# Patient Record
Sex: FEMALE | Race: WHITE | URBAN - METROPOLITAN AREA
[De-identification: names, ages, dates, MRNs, and addresses within clinical notes are randomized per-mention and may not be internally consistent; named-entity substitution may affect disease eponyms.]

---

## 2019-03-18 ENCOUNTER — OFFICE VISIT (OUTPATIENT)
Dept: RHEUMATOLOGY | Age: 34
End: 2019-03-18

## 2019-03-18 VITALS
HEIGHT: 72 IN | HEART RATE: 94 BPM | BODY MASS INDEX: 39.68 KG/M2 | TEMPERATURE: 98.6 F | RESPIRATION RATE: 20 BRPM | SYSTOLIC BLOOD PRESSURE: 127 MMHG | WEIGHT: 293 LBS | DIASTOLIC BLOOD PRESSURE: 87 MMHG | OXYGEN SATURATION: 96 %

## 2019-03-18 DIAGNOSIS — R76.8 RHEUMATOID FACTOR POSITIVE: ICD-10-CM

## 2019-03-18 DIAGNOSIS — E66.9 OBESITY, UNSPECIFIED CLASSIFICATION, UNSPECIFIED OBESITY TYPE, UNSPECIFIED WHETHER SERIOUS COMORBIDITY PRESENT: Primary | ICD-10-CM

## 2019-03-18 DIAGNOSIS — R70.0 ELEVATED SED RATE: ICD-10-CM

## 2019-03-18 DIAGNOSIS — E11.8 CONTROLLED DIABETES MELLITUS TYPE 2 WITH COMPLICATIONS, UNSPECIFIED WHETHER LONG TERM INSULIN USE (HCC): ICD-10-CM

## 2019-03-18 RX ORDER — MELOXICAM 15 MG/1
15 TABLET ORAL DAILY
COMMUNITY

## 2019-03-18 RX ORDER — CYCLOBENZAPRINE HCL 10 MG
10 TABLET ORAL
COMMUNITY

## 2019-03-18 RX ORDER — BUSPIRONE HYDROCHLORIDE 10 MG/1
10 TABLET ORAL 2 TIMES DAILY
COMMUNITY

## 2019-03-18 RX ORDER — PREDNISONE 20 MG/1
TABLET ORAL
Qty: 21 TAB | Refills: 0 | Status: SHIPPED | OUTPATIENT
Start: 2019-03-18 | End: 2019-04-16

## 2019-03-18 RX ORDER — METFORMIN HYDROCHLORIDE 500 MG/1
1000 TABLET, EXTENDED RELEASE ORAL 2 TIMES DAILY
COMMUNITY

## 2019-03-18 RX ORDER — FLUOXETINE HYDROCHLORIDE 40 MG/1
CAPSULE ORAL DAILY
COMMUNITY

## 2019-03-18 RX ORDER — PANTOPRAZOLE SODIUM 40 MG/1
40 TABLET, DELAYED RELEASE ORAL DAILY
COMMUNITY

## 2019-03-18 RX ORDER — LISINOPRIL AND HYDROCHLOROTHIAZIDE 10; 12.5 MG/1; MG/1
TABLET ORAL DAILY
COMMUNITY

## 2019-03-18 RX ORDER — GLIPIZIDE 10 MG/1
10 TABLET ORAL DAILY
COMMUNITY

## 2019-03-18 NOTE — PROGRESS NOTES
REASON FOR VISIT    This is the initial evaluation for Ms. Michael Ambrose a 28 y.o.  female for question of diffuse joint pains. The patient is referred to the [facility] at the request of robin bradley. HISTORY OF PRESENT ILLNESS     This is a 28 y.o. female with hx DM, anxiety, depression, GERD. Today, the patient complains of all over body pain especially in lower back and neck but it radiates to other parts as well. She has tried meloxicam and flexeril for this. MHAQ: 0.5  Pain scale: 75/100  The patient was found to have inflammation in 11/2018 after blood work was done. Lyme test was negative. She was treated for gout and it didn't help. She was supposed to go to [facility] but she never did. The patient has pain in feet, knees, elbows, neck and mid to lower back. She has had pain since 11/2018. Before that she didn't have continuous pain. She has pain all the time. Meloxicam helps it slightly but doesn't get rid of the pain. It is a dull along with stabbing/burning pain. Pain is worsened by moving and is slightly better with resting. Nothing else helps. She notes that from 11/2018 to 1/2019 she had swollen hot elbow. That is when she was treated for gout but it didn't work. NO swollen joints now. She has pain in hands as well. In the morning, in her II MCP she has pain. This gets better as the day goes on. Morning stiffness in hands for a few hours. No steroids recently. NO recent infections before pain started. NO travel before pain started. NO new medications before pain started. Last summer she did not spend time outside. She spent most of her time indoors. REVIEW OF SYSTEMS    A 15 point review of systems was performed and summarized below. The questionnaire was reviewed with the patient and scanned into the patient's medical record.     General: endorses  fatigue, weakness, denies recent weight gain, recent weight loss, fever, night sweats  Musculoskeletal: endorses  joint pain,muscle pain  denies joint swelling, morning stiffness    Ears:  denies ringing in ears, loss of hearing, deafness  Eyes:  denies pain, redness, loss of vision, double vision, blurred vision, dryness, foreign body sensation  Mouth:  denies sore tongue, oral ulcers, bleeding gums, loss of taste, dryness, increased dental caries  Nose: denies nosebleeds, loss of smell, nasal ulcers  Throat:  denies frequent sore throats, hoarseness, difficulty in swallowing, pain in jaw while chewing  Neck: endorses , tender glands denies swollen glands  Cardiopulmonary: endorses dry cough denies pain in chest, irregular heart beat, sudden changes in heart beat, shortness of breath, difficulty breathing at night, , productive cough, coughing of blood, wheezing  Gastrointestinal: endorses  heartburn, denies nausea,stomach pain relieved by food, vomiting of blood/\"coffee grounds\", jaundice, increasing constipation, persistent diarrhea, blood in stools, black stools  Genitourinary:  denies nocturia, difficult urination, pain or burning on urination, blood in urine, cloudy urine, pus in urine, genital discharge, frequent urination, vaginal dryness, rash/ulcers, sexual difficulties  Hematologic:  denies anemia, bleeding tendency, blood clots  Skin: endorses  easy bruising, sun sensitive, redness,hair loss,denies rash, hives, skin tightness, nodules/bumps,  color changes of hands or feet in the cold (Raynaud's), nailbed changes, mechanics hands  Neurologic: endorses headaches, numbness or tingling in hands/feet, denies dizziness, muscle weakness, memory loss  Psychiatric: denies depression, excessive worries, PTSD, Bipolar  Sleep: endorses poor sleep (5-7 hours), daytime somnolence, difficulty falling asleep, difficulty staying asleep   denies  denies snoring, apnea,   PAST MEDICAL HISTORY    Past Medical History:   Diagnosis Date    Depression     Diabetes ([facility] [address]     Hypertension         Past Surgical History:   Procedure Laterality Date    HX GYN  10/2008    , 10/2008 & 2017       FAMILY HISTORY    Family History   Problem Relation Age of Onset    Thyroid Disease Father     Gout Father        SOCIAL HISTORY    Social History     Tobacco Use    Smoking status: Never Smoker    Smokeless tobacco: Never Used   Substance Use Topics    Alcohol use: No     Frequency: Never    Drug use: No       MEDICATIONS        ALLERGIES    Allergies   Allergen Reactions    Percocet [Oxycodone-Acetaminophen] Rash       PHYSICAL EXAMINATION    Visit Vitals  /87 (BP 1 Location: Right arm, BP Patient Position: Sitting)   Pulse 94   Temp 98.6 °F (37 °C) (Oral)   Resp 20   Ht 6' 1\" (1.854 m)   Wt 345 lb 9.6 oz (156.8 kg)   SpO2 96%   BMI 45.60 kg/m²     Body mass index is 45.6 kg/m². General: NAD morbidly obese  HEENT: PERRL, anicteric, non-injected sclerae; oropharynx without ulcers, erythema, or exudate. Moist mucous membranes. Lymphatic: No cervical or axillary lymphadenopathy. Cardiovascular: S1, S2,no R/M/G  Pulmonary: CTA b/l. No wheezes/rales/rhonchi. Abdominal: Soft,NTND, + BS. Skin: flat erythema on the skin everywhere. Neuro: Alert; able to carry normal conversation    Musculoskeletal:   Multiple tender points. TTP of cervical spine and lower lumbar spine  TTP of b/l tronchateric bursa noted    DATA REVIEW    Prior medical records were reviewed and if applicable are summarized as below:    Labs:   2018: RF 91.7, CCP 4.7,, CRP 4.2, ESR 38 ALYSSIA negative, cbc, bmp unremarkable,  C3, C4 elevated, vitamin D normal, urinalysis with some protein    Imaging:   N/A    ASSESSMENT AND PLAN    A 28 y.o. female with hx of DM, HTN, morbid obesity presents for evaluation of joint pains. The patient's pain is largely non inflammatory but her hand pain does sound inflammatory. She also has positive serologies which is concerning.   Her elevated inflammatory markers are likely due to female sex and morbid obesity. # Arthralgias:  Unclear if inflammatory but hand pain could be inflammatory in nature  - will do a trial of prednisone 20mg oral daily for 2 weeks, then 10 mg daily for 2 weeks then stop  -  I counseled the patient on the risk of avascular necrosis from corticosteroids. For each 20 mg of prednisone taken for over a month, the risk of AVN is 5%. It can also lead to weight gain, mood imbalances, osteoporosis, acne etc.  - repeat RF and CCP    # Elevated RF:  - hepatitis C,  - SSA/B  - TSH    # Elevated inflammatory markers: as noted above, likely due to obesity and female sex  - repeat markers today    # Diabetes:  - advised patient to monitor her blood sugars closely while on the steroids. The patient voiced understanding of the aforementioned assessment and plan. Summary of plan was provided in the After Visit Summary patient instructions. I also provided education about [employer] setup and utility. Ms. Amira Alicia has a reminder for a \"due or due soon\" health maintenance. I have asked that she contact her primary care provider for follow-up on this health maintenance.     TODAY'S ORDERS    Orders Placed This Encounter    CYCLIC CITRUL PEPTIDE AB, IGG    C REACTIVE PROTEIN, QT    SED RATE (ESR)    RHEUMATOID FACTOR, QL    CHRONIC HEPATITIS PANEL    SJOGREN'S ABS, SSA AND SSB    TSH +T4F + T-3UPT    busPIRone (BUSPAR) 10 mg tablet    cyclobenzaprine (FLEXERIL) 10 mg tablet    FLUoxetine (PROZAC) 40 mg capsule    glipiZIDE (GLUCOTROL) 10 mg tablet    lisinopril-hydroCHLOROthiazide (PRINZIDE, ZESTORETIC) 10-12.5 mg per tablet    meloxicam (MOBIC) 15 mg tablet    metFORMIN ER (GLUCOPHAGE XR) 500 mg tablet    fish oil-omega-3 fatty acids 340-1,000 mg capsule    pantoprazole (PROTONIX) 40 mg tablet    SITagliptin (JANUVIA) 100 mg tablet    levonorgest-eth.estradiol-iron (BALCOLTRA) 0.1 mg-0.02 mg (21)/36.5 mg(7) tab    prenatal vit calc,iron,folic (PRENATAL VITAMIN PO)    predniSONE (DELTASONE) 20 mg tablet       Future Appointments   Date Time Provider Department Center   4/16/2019  8:00 AM Jesse Marte  Yolanda Whitaker MD    Adult Rheumatology   [facility]  A Part of [address], [address] [place] [address]   Phone [phone]  Fax [phone]

## 2019-03-18 NOTE — PROGRESS NOTES
Chief Complaint   Patient presents with    Joint Pain     neck     1. Have you been to the ER, urgent care clinic since your last visit? Hospitalized since your last visit? No    2. Have you seen or consulted any other health care providers outside of the [address] [place] [address] since your last visit? Include any pap smears or colon screening.  No

## 2019-03-20 LAB
CCP IGA+IGG SERPL IA-ACNC: 28 UNITS (ref 0–19)
COMMENT, 144067: NORMAL
CRP SERPL-MCNC: 27.3 MG/L (ref 0–4.9)
ENA SS-A AB SER-ACNC: <0.2 AI (ref 0–0.9)
ENA SS-B AB SER-ACNC: <0.2 AI (ref 0–0.9)
ERYTHROCYTE [SEDIMENTATION RATE] IN BLOOD BY WESTERGREN METHOD: 45 MM/HR (ref 0–32)
HBV CORE AB SERPL QL IA: NEGATIVE
HBV CORE IGM SERPL QL IA: NEGATIVE
HBV E AB SERPL QL IA: NEGATIVE
HBV E AG SERPL QL IA: NEGATIVE
HBV SURFACE AB SER QL: NON REACTIVE
HBV SURFACE AG SERPL QL IA: NEGATIVE
HCV AB S/CO SERPL IA: <0.1 S/CO RATIO (ref 0–0.9)
RHEUMATOID FACT SERPL-ACNC: 60.8 IU/ML (ref 0–13.9)
T3RU NFR SERPL: 24 % (ref 24–39)
T4 FREE SERPL-MCNC: 1.19 NG/DL (ref 0.82–1.77)
TSH SERPL DL<=0.005 MIU/L-ACNC: 1.88 UIU/ML (ref 0.45–4.5)

## 2019-04-16 ENCOUNTER — OFFICE VISIT (OUTPATIENT)
Dept: RHEUMATOLOGY | Age: 34
End: 2019-04-16

## 2019-04-16 VITALS
HEART RATE: 85 BPM | OXYGEN SATURATION: 96 % | BODY MASS INDEX: 39.68 KG/M2 | TEMPERATURE: 98.1 F | SYSTOLIC BLOOD PRESSURE: 129 MMHG | RESPIRATION RATE: 20 BRPM | DIASTOLIC BLOOD PRESSURE: 83 MMHG | WEIGHT: 293 LBS | HEIGHT: 72 IN

## 2019-04-16 DIAGNOSIS — Z79.60 LONG-TERM USE OF IMMUNOSUPPRESSANT MEDICATION: Primary | ICD-10-CM

## 2019-04-16 DIAGNOSIS — Z79.4 CONTROLLED TYPE 2 DIABETES MELLITUS WITH COMPLICATION, WITH LONG-TERM CURRENT USE OF INSULIN (HCC): ICD-10-CM

## 2019-04-16 DIAGNOSIS — L71.9 ROSACEA: ICD-10-CM

## 2019-04-16 DIAGNOSIS — M54.50 CHRONIC BILATERAL LOW BACK PAIN WITHOUT SCIATICA: ICD-10-CM

## 2019-04-16 DIAGNOSIS — M05.9 SEROPOSITIVE RHEUMATOID ARTHRITIS (HCC): ICD-10-CM

## 2019-04-16 DIAGNOSIS — E66.01 MORBID OBESITY (HCC): ICD-10-CM

## 2019-04-16 DIAGNOSIS — E11.8 CONTROLLED TYPE 2 DIABETES MELLITUS WITH COMPLICATION, WITH LONG-TERM CURRENT USE OF INSULIN (HCC): ICD-10-CM

## 2019-04-16 DIAGNOSIS — G89.29 CHRONIC BILATERAL LOW BACK PAIN WITHOUT SCIATICA: ICD-10-CM

## 2019-04-16 RX ORDER — INSULIN GLARGINE 100 [IU]/ML
15 INJECTION, SOLUTION SUBCUTANEOUS
COMMUNITY

## 2019-04-16 RX ORDER — INSULIN GLARGINE 100 [IU]/ML
10 INJECTION, SOLUTION SUBCUTANEOUS DAILY
COMMUNITY

## 2019-04-16 RX ORDER — HYDROXYCHLOROQUINE SULFATE 200 MG/1
400 TABLET, FILM COATED ORAL DAILY
Qty: 60 TAB | Refills: 3 | Status: SHIPPED | OUTPATIENT
Start: 2019-04-16 | End: 2019-09-09 | Stop reason: SDUPTHER

## 2019-04-16 NOTE — PROGRESS NOTES
REASON FOR VISIT Patient presents for a follow up visit. HISTORY OF DISEASE: Seropositive Rheumatoid Arthritis Year of diagnosis:  First visit with me: 3/2019 Cumulative disease manifestations: small joint arthralgias; positive serologies Positive serologies/labs: RF, CCP Negative serologies/labs: Other co-morbidities: DM, anxiety, depression, GERD Relapses:  
  
Past treatment history: 
Prednisone: Prednisone taper Non-biologic DMARD:  
Biologic:  
Other: 
  
HISTORY OF PRESENT ILLNESS This is a 28 y.o. female with hx DM, anxiety, depression, GERD. The patient notes that the prednisone didn't help but it made her push through the pain due to more energy. It did cause an increase in sugars as well. She is now on insulin as well. It helped with the stiffness in the hands and the feet. She is not sure but it could have helped with the hand pain as well. She might have moved her hand more. It did nothing for the back pain in the entire back. Hand pain is now gone along with foot, and leg pain. The back pain is the only persistent pain now. Location: entire back Quality: aching pain Severity:  10/10 Timing:all day Duration:  years Modifying factors: improvement in other pain NO swelling in any of the joints. She folded laundry and the hands didn't hurt. She finished the prednisone 2 days ago. REVIEW OF SYSTEMS A comprehensive review of systems was performed and pertinent results are documented in the HPI, review of systems is otherwise non-contributory. PAST MEDICAL HISTORY Past Medical History:  
Diagnosis Date  Depression  Diabetes ([address]  Hypertension Past Surgical History:  
Procedure Laterality Date  HX GYN  10/2008 , 10/2008 & 2017 FAMILY HISTORY Family History Problem Relation Age of Onset  Thyroid Disease Father  Gout Father SOCIAL HISTORY Social History Tobacco Use  
  Smoking status: Never Smoker  Smokeless tobacco: Never Used Substance Use Topics  Alcohol use: No  
  Frequency: Never  Drug use: No  
 
 
MEDICATIONS Current Outpatient Medications:  
  insulin glargine (LANTUS U-100 INSULIN) 100 unit/mL injection, 15 Units by SubCUTAneous route nightly., Disp: , Rfl:  
  insulin glargine (LANTUS U-100 INSULIN) 100 unit/mL injection, 10 Units by SubCUTAneous route daily. , Disp: , Rfl:  
  hydroxychloroquine (PLAQUENIL) 200 mg tablet, Take 2 Tabs by mouth daily. , Disp: 60 Tab, Rfl: 3 
  busPIRone (BUSPAR) 10 mg tablet, Take 10 mg by mouth two (2) times a day., Disp: , Rfl:  
  cyclobenzaprine (FLEXERIL) 10 mg tablet, Take 10 mg by mouth., Disp: , Rfl:  
  FLUoxetine (PROZAC) 40 mg capsule, Take  by mouth daily. , Disp: , Rfl:  
  glipiZIDE (GLUCOTROL) 10 mg tablet, Take 10 mg by mouth daily. , Disp: , Rfl:  
  lisinopril-hydroCHLOROthiazide (PRINZIDE, ZESTORETIC) 10-12.5 mg per tablet, Take  by mouth daily. , Disp: , Rfl:  
  meloxicam (MOBIC) 15 mg tablet, Take 15 mg by mouth daily. , Disp: , Rfl:  
  metFORMIN ER (GLUCOPHAGE XR) 500 mg tablet, Take 1,000 mg by mouth two (2) times a day., Disp: , Rfl:  
  fish oil-omega-3 fatty acids 340-1,000 mg capsule, Take 1 Cap by mouth daily. , Disp: , Rfl:  
  SITagliptin (JANUVIA) 100 mg tablet, Take 100 mg by mouth daily. , Disp: , Rfl:  
  levonorgest-eth.estradiol-iron (BALCOLTRA) 0.1 mg-0.02 mg (21)/36.5 mg(7) tab, Take  by mouth., Disp: , Rfl:  
  pantoprazole (PROTONIX) 40 mg tablet, Take 40 mg by mouth daily. , Disp: , Rfl:  
  prenatal vit calc,iron,folic (PRENATAL VITAMIN PO), Take  by mouth., Disp: , Rfl: ALLERGIES Allergies Allergen Reactions  Percocet [Oxycodone-Acetaminophen] Rash PHYSICAL EXAMINATION Visit Vitals /83 (BP 1 Location: Right arm, BP Patient Position: Sitting) Pulse 85 Temp 98.1 °F (36.7 °C) (Oral) Resp 20 Ht 6' 1\" (1.854 m) Wt 347 lb (157.4 kg) SpO2 96% BMI 45.78 kg/m² Body mass index is 45.78 kg/m². General: NAD morbidly obese HEENT: PERRL, anicteric, non-injected sclerae; oropharynx without ulcers, erythema, or exudate. Moist mucous membranes. Lymphatic: No cervical or axillary lymphadenopathy. Cardiovascular: S1, S2,no R/M/G Pulmonary: CTA b/l. No wheezes/rales/rhonchi. Abdominal: Soft,NTND, + BS. Skin: flat erythema on the skin everywhere. Neuro: Alert; able to carry normal conversation Musculoskeletal:  
Multiple tender points. TTP of cervical spine and lower lumbar spine 
+ patello-compression test b/l Joint Count 4/16/2019 Patient pain (0-100) 30 MHAQ 0.375 Left shoulder - Tender 1 Left knee - Tender 1 Right shoulder - Tender 1 Right 2nd PIP - Swollen 0 Right knee - Tender 1 Tender Joint Count (Total) 4 Swollen Joint Count (Total) 0 Physician Assessment (0-10) 0 Patient Assessment (0-10) 5 CDAI Total (calculated) 9 DATA REVIEW Prior medical records were reviewed and if applicable are summarized as below: 
 
Labs:  
3/2019: CCP 28, CRP 27.3, ESR 45, RF 60.8, Hep B, C negative, SSA/B negative, TSH normal 
11/2018: RF 91.7, CCP 4.7, CRP 4.2, ESR 38 ALYSSIA negative, cbc, bmp unremarkable,  C3, C4 elevated, vitamin D normal, urinalysis with some protein Imaging: N/A 
 
ASSESSMENT AND PLAN 
 
A 28 y.o. female with hx of DM, HTN, morbid obesity, seropositive rheumatoid arthritis presents for a follow up visit. The patient likely has rheumatoid arthritis given that her hand and foot pain responded to prednisone. The patient's back pain is likely mechanical and is worsened by her obesity. # Seropositive Rheumatoid Arthritis:  As noted above, patient likely has rheumatoid arthritis especially given response to prednisone. - will start patient on plaquenil 200 mg BID. I explained the risks and benefits of plaquenil and answered all the questions in detail.  Advised patient to get a baseline eye examination. - no need for prednisone today # Low back pain: likely mechanical in nature - PT referral  
- advised weight loss - Lumbar spine x-ray ordered # Obesity: 
- advised weight loss # Diabetes: 
- advised patient to monitor her blood sugars closely while on the steroids. # Med Toxicity: 
- hepatitis panel negative 3/2019 
- advised to get a baseline eye exam for retinal toxicity from plaquenil. The patient voiced understanding of the aforementioned assessment and plan. Summary of plan was provided in the After Visit Summary patient instructions. I also provided education about MyChart setup and utility. Ms. Junaid Haas has a reminder for a \"due or due soon\" health maintenance. I have asked that she contact her primary care provider for follow-up on this health maintenance. TODAY'S ORDERS Orders Placed This Encounter  XR HAND RT MIN 3 V  
 XR HAND LT MIN 3 V  
 XR FOOT RT MIN 3 V  
 XR FOOT LT MIN 3 V  
 XR SPINE LUMB 2 OR 3 V  
 REFERRAL TO PHYSICAL THERAPY  insulin glargine (LANTUS U-100 INSULIN) 100 unit/mL injection  insulin glargine (LANTUS U-100 INSULIN) 100 unit/mL injection  hydroxychloroquine (PLAQUENIL) 200 mg tablet Future Appointments Date Time Provider Radha Stein 7/16/2019  8:00 AM Sanaz Cisneros MD [address] [place] [address] Imani Salinas MD 
 
Adult Rheumatology [facility] A Part of [address] Phone [phone] Fax [phone]

## 2019-04-16 NOTE — PROGRESS NOTES
Chief Complaint Patient presents with  
 Other 1. Have you been to the ER, urgent care clinic since your last visit? Hospitalized since your last visit? No 
 
2. Have you seen or consulted any other health care providers outside of the [address] [place] [address] since your last visit? Include any pap smears or colon screening.  No

## 2019-10-09 RX ORDER — HYDROXYCHLOROQUINE SULFATE 200 MG/1
TABLET, FILM COATED ORAL
Qty: 60 TAB | Refills: 0 | Status: SHIPPED | OUTPATIENT
Start: 2019-10-09

## 2020-01-03 ENCOUNTER — OFFICE VISIT (OUTPATIENT)
Dept: RHEUMATOLOGY | Age: 35
End: 2020-01-03

## 2020-01-03 VITALS
OXYGEN SATURATION: 97 % | WEIGHT: 293 LBS | HEART RATE: 94 BPM | SYSTOLIC BLOOD PRESSURE: 128 MMHG | BODY MASS INDEX: 39.68 KG/M2 | HEIGHT: 72 IN | DIASTOLIC BLOOD PRESSURE: 81 MMHG | RESPIRATION RATE: 20 BRPM | TEMPERATURE: 98.8 F

## 2020-01-03 DIAGNOSIS — E66.01 MORBID OBESITY (HCC): ICD-10-CM

## 2020-01-03 DIAGNOSIS — L71.9 ROSACEA: ICD-10-CM

## 2020-01-03 DIAGNOSIS — M79.7 FIBROMYALGIA: ICD-10-CM

## 2020-01-03 DIAGNOSIS — M05.9 SEROPOSITIVE RHEUMATOID ARTHRITIS (HCC): Primary | ICD-10-CM

## 2020-01-03 RX ORDER — GABAPENTIN 100 MG/1
CAPSULE ORAL
COMMUNITY
Start: 2019-10-02

## 2020-01-03 NOTE — PROGRESS NOTES
Chief Complaint   Patient presents with    Arthritis     ankles, hip, back, hands     1. Have you been to the ER, urgent care clinic since your last visit? Hospitalized since your last visit? No    2. Have you seen or consulted any other health care providers outside of the [address] [place] [address] since your last visit? Include any pap smears or colon screening.  No

## 2020-01-03 NOTE — PROGRESS NOTES
REASON FOR VISIT    Patient presents for a follow up visit. HISTORY OF DISEASE: Seropositive Rheumatoid Arthritis    Year of diagnosis:   First visit with me: 3/2019  Cumulative disease manifestations: small joint arthralgias; positive serologies  Positive serologies/labs: RF, CCP  Negative serologies/labs: Other co-morbidities: DM, anxiety, depression, GERD  Relapses:      Past treatment history:  Prednisone: Prednisone taper  Non-biologic DMARD: Plaquenil 400 mg daily (2019 then stopped due to hallucinations, suicidal thoughts)  Biologic:   Other:     HISTORY OF PRESENT ILLNESS     The patient notes plaquenil caused her to have hallucinations, suicidal thoughts. She stopped the medicine in 3 weeks then. She is on gabapentin for back pain. She has not been on anything for about 8 months. She has pain in back. She has pain in hands, ankles, right hip down to the knee. Pain is at rest and activity. Nothing helps. Nothing feels swollen. Prednisone had not helped when she took it last year. REVIEW OF SYSTEMS    A comprehensive review of systems was performed and pertinent results are documented in the HPI, review of systems is otherwise non-contributory. PAST MEDICAL HISTORY    Past Medical History:   Diagnosis Date    Depression     Diabetes ([facility] [address]     Hypertension         Past Surgical History:   Procedure Laterality Date    HX GYN  10/2008    , 10/2008 & 2017       FAMILY HISTORY    Family History   Problem Relation Age of Onset    Thyroid Disease Father     Gout Father        SOCIAL HISTORY    Social History     Tobacco Use    Smoking status: Never Smoker    Smokeless tobacco: Never Used   Substance Use Topics    Alcohol use: No     Frequency: Never    Drug use: No       MEDICATIONS      Current Outpatient Medications:     gabapentin (NEURONTIN) 100 mg capsule, Take 1 capsule in AM, 1 capsule in afternoon, and 3 capsules at bedtime. , Disp: , Rfl:     insulin glargine (LANTUS U-100 INSULIN) 100 unit/mL injection, 15 Units by SubCUTAneous route nightly., Disp: , Rfl:     insulin glargine (LANTUS U-100 INSULIN) 100 unit/mL injection, 10 Units by SubCUTAneous route daily. , Disp: , Rfl:     busPIRone (BUSPAR) 10 mg tablet, Take 10 mg by mouth two (2) times a day., Disp: , Rfl:     cyclobenzaprine (FLEXERIL) 10 mg tablet, Take 10 mg by mouth., Disp: , Rfl:     FLUoxetine (PROZAC) 40 mg capsule, Take  by mouth daily. , Disp: , Rfl:     glipiZIDE (GLUCOTROL) 10 mg tablet, Take 10 mg by mouth daily. , Disp: , Rfl:     lisinopril-hydroCHLOROthiazide (PRINZIDE, ZESTORETIC) 10-12.5 mg per tablet, Take  by mouth daily. , Disp: , Rfl:     meloxicam (MOBIC) 15 mg tablet, Take 15 mg by mouth daily. , Disp: , Rfl:     metFORMIN ER (GLUCOPHAGE XR) 500 mg tablet, Take 1,000 mg by mouth two (2) times a day., Disp: , Rfl:     fish oil-omega-3 fatty acids 340-1,000 mg capsule, Take 1 Cap by mouth daily. , Disp: , Rfl:     SITagliptin (JANUVIA) 100 mg tablet, Take 100 mg by mouth daily. , Disp: , Rfl:     levonorgest-eth.estradiol-iron (BALCOLTRA) 0.1 mg-0.02 mg (21)/36.5 mg(7) tab, Take  by mouth., Disp: , Rfl:     prenatal vit calc,iron,folic (PRENATAL VITAMIN PO), Take  by mouth., Disp: , Rfl:     hydroxychloroquine (PLAQUENIL) 200 mg tablet, TAKE 2 TABLETS BY MOUTH EVERY DAY, Disp: 60 Tab, Rfl: 0    pantoprazole (PROTONIX) 40 mg tablet, Take 40 mg by mouth daily. , Disp: , Rfl:     ALLERGIES    Allergies   Allergen Reactions    Percocet [Oxycodone-Acetaminophen] Rash       PHYSICAL EXAMINATION    Visit Vitals  /81 (BP 1 Location: Right arm, BP Patient Position: Sitting)   Pulse 94   Temp 98.8 °F (37.1 °C) (Oral)   Resp 20   Ht 6' 1\" (1.854 m)   Wt 339 lb (153.8 kg)   SpO2 97%   BMI 44.73 kg/m²     Body mass index is 44.73 kg/m². General: NAD morbidly obese  HEENT: PERRL, anicteric, non-injected sclerae; oropharynx without ulcers, erythema, or exudate.   Moist mucous membranes. Lymphatic: No cervical or axillary lymphadenopathy. Cardiovascular: S1, S2,no R/M/G  Pulmonary: CTA b/l. No wheezes/rales/rhonchi. Abdominal: Soft,NTND, + BS. Skin: flat erythema on the skin everywhere. Neuro: Alert; able to carry normal conversation    Musculoskeletal:   Multiple tender points. TTP of lower lumbar spine    Joint Count 1/3/2020 4/16/2019   Patient pain (0-100) 70 30   MHAQ 0.625 0.375   Left shoulder - Tender - 1   Left knee - Tender - 1   Right shoulder - Tender - 1   Right 2nd PIP - Swollen - 0   Right knee - Tender - 1   Tender Joint Count (Total) - 4   Swollen Joint Count (Total) - 0   Physician Assessment (0-10) - 0   Patient Assessment (0-10) 6 5   CDAI Total (calculated) - 9       DATA REVIEW    Prior medical records were reviewed and if applicable are summarized as below:    Labs:   3/2019: CCP 28, CRP 27.3, ESR 45, RF 60.8, Hep B, C negative, SSA/B negative, TSH normal  11/2018: RF 91.7, CCP 4.7, CRP 4.2, ESR 38 ALYSSIA negative, cbc, bmp unremarkable,  C3, C4 elevated, vitamin D normal, urinalysis with some protein    Imaging:   Lumbar spine x-ray (4/2019): mild to moderate lumbosacral spondylosis  Hand x-rays (4/2019): normal   Foot x-rays (4/2019): normal    ASSESSMENT AND PLAN    A 27 y.o. female with hx of DM, HTN, morbid obesity with positive RA serologies presents for a follow up visit. The patient has been on no therapy for a year and is still without synovitis. This argues against rheumatoid arthritis. She likely has positive serologies but not clinical RA. Her joint pains are likely due to fibromyalgia. # Fibromyalgia:    - advised exercise for the patient. - I explained the diagnosis to the patient in detail and answered all the questions.     - advised weigh tloss    # Low back pain: likely mechanical in nature  - improved with gabapentin    # Positive RF and CCP:  -will monitor for development of RA    # Obesity:  - advised weight loss    # Diabetes:  - advised patient to monitor her blood sugars closely while on the steroids. # Med Toxicity:  - hepatitis panel negative 3/2019    The patient voiced understanding of the aforementioned assessment and plan. Summary of plan was provided in the After Visit Summary patient instructions. I also provided education about MyChart setup and utility. Ms. Aki Burt has a reminder for a \"due or due soon\" health maintenance. I have asked that she contact her primary care provider for follow-up on this health maintenance.     TODAY'S ORDERS    Orders Placed This Encounter    gabapentin (NEURONTIN) 100 mg capsule       Future Appointments   Date Time Provider Radha Stein   1/4/2021 10:00 AM Yg Marshall  Yolanda Whitaker MD    Adult Rheumatology   [facility]  A Part of [facility]  [facility], [address] [facility]   Phone [phone]  Fax [phone]

## 2020-01-03 NOTE — PATIENT INSTRUCTIONS
Please fill out your [address] you will receive after your visit in the mail or via [phone]! FIBROMYALGIA    Fibromyalgia is a disease characterized by chronic widespread musculoskeletal pain. Fibromyalgia is caused by abnormal processing of pain signals in the central nervous system, leading to exaggerated pain responses. There are functional MRI studies that have shown neuroplasticity (re-wiring) of the pain pathways in the brain. Physical and Mental Exercise    The mainstay of therapy includes non-pharmacologic therapies such as cardiovascular exercise and Cognitive Behavioral Therapy which have been shown to be of benefit ([address] [facility], Am J Med 2009). Silvestre Chi in particular has proven efficacy in the treatment of fibromyalgia Keagan Hager al. [facility] J Med 2010; [id number]). Performing at least 60 minutes per day of Deysi Aleks has been shown to improve pain without medical management. Medications    After discussing with your primary care and if medications are pursued, pregabalin (Lyrica), gabapentin (Neurontin), milnacipran (Rel[employer] Gift), and duloxetine (Cymbalta) are FDA approved medications for the treatment of fibromyalgia. Narcotic Pain Medications Are BAD    Narcotics, such as oxycodone, hydrocodone, morphine, dilaudid, or codeine, have not been proven to be efficacious in the treatment of fibromyalgia. In fact, narcotic use in this patient population has been observed to exacerbate depression, and may enhance the hyperalgesia which is characteristic of this condition ([facility] Edge Rheum 2006). They also are at increased risk for opioid-induced hyperalgesia due predominantly to central sensitization Krysta Bess al. [facility] Clin Rheumatol. 2013 Mar;19(2):72-7). Specifically, a double-blind placebo-controlled trial by Silvio Sandoval al published in 1995 demonstrated that intravenous morphine did not reduce pain in fibromyalgia patients.  A study by Javid Goodrich al published in 2003 showed that fibromyalgia patients taking oral opiates did not experience improvement in their pain at four years of follow up, and also reported increased depression over the last two years of the study. There is subsequent concern that the prolonged use of narcotics to treat fibromyalgia may cause harm to these patients Trista Rose, Pain 2005). Finally, opioid use in fibromyalgia had poorer symptoms and functional and occupational status compared to nonusers (Rustam BACA et al. Pain Res Treat. 1926;[id number]). Therefore, I recommend that narcotics be avoided in all patients with fibromyalgia. My Recommendations    I recommend Silvestre Chi stretching exercises for at least 30 minutes per day. The [employer] has made a videotape of Silvestre Chi that you can borrow from [employer], purchase online or watch for free on [email or web address] com Silvestre Chi for Arthritis. I strongly recommend you to join a gym that has an indoor pool, to do aqua therapy and Silvestre Chi classes. Resources include: [employer], [employer], "[employer] and the [facility]. We discussed treating secondary causes, such as sleep apnea, poor sleep quality, depression, anxiety weight loss, vitamin deficiencies, such as vitamin D, and pursuing aquatherapy.  I encourage you to do [address].